# Patient Record
Sex: MALE | Race: ASIAN | Employment: FULL TIME | URBAN - METROPOLITAN AREA
[De-identification: names, ages, dates, MRNs, and addresses within clinical notes are randomized per-mention and may not be internally consistent; named-entity substitution may affect disease eponyms.]

---

## 2017-10-10 ENCOUNTER — OFFICE VISIT (OUTPATIENT)
Dept: FAMILY MEDICINE CLINIC | Facility: CLINIC | Age: 32
End: 2017-10-10

## 2017-10-10 ENCOUNTER — LAB ENCOUNTER (OUTPATIENT)
Dept: LAB | Age: 32
End: 2017-10-10
Attending: FAMILY MEDICINE
Payer: COMMERCIAL

## 2017-10-10 VITALS
RESPIRATION RATE: 18 BRPM | TEMPERATURE: 98 F | BODY MASS INDEX: 28.64 KG/M2 | HEIGHT: 73 IN | HEART RATE: 96 BPM | WEIGHT: 216.13 LBS | SYSTOLIC BLOOD PRESSURE: 118 MMHG | DIASTOLIC BLOOD PRESSURE: 86 MMHG

## 2017-10-10 DIAGNOSIS — Z00.00 ROUTINE GENERAL MEDICAL EXAMINATION AT A HEALTH CARE FACILITY: ICD-10-CM

## 2017-10-10 DIAGNOSIS — R20.2 PARESTHESIA OF BOTH FEET: ICD-10-CM

## 2017-10-10 DIAGNOSIS — Z00.00 ROUTINE GENERAL MEDICAL EXAMINATION AT A HEALTH CARE FACILITY: Primary | ICD-10-CM

## 2017-10-10 DIAGNOSIS — Z23 NEED FOR VACCINATION: ICD-10-CM

## 2017-10-10 PROCEDURE — 80061 LIPID PANEL: CPT | Performed by: FAMILY MEDICINE

## 2017-10-10 PROCEDURE — 82306 VITAMIN D 25 HYDROXY: CPT | Performed by: FAMILY MEDICINE

## 2017-10-10 PROCEDURE — 82607 VITAMIN B-12: CPT | Performed by: FAMILY MEDICINE

## 2017-10-10 PROCEDURE — 80050 GENERAL HEALTH PANEL: CPT | Performed by: FAMILY MEDICINE

## 2017-10-10 PROCEDURE — 90715 TDAP VACCINE 7 YRS/> IM: CPT | Performed by: FAMILY MEDICINE

## 2017-10-10 PROCEDURE — 90471 IMMUNIZATION ADMIN: CPT | Performed by: FAMILY MEDICINE

## 2017-10-10 PROCEDURE — 36415 COLL VENOUS BLD VENIPUNCTURE: CPT | Performed by: FAMILY MEDICINE

## 2017-10-10 PROCEDURE — 99385 PREV VISIT NEW AGE 18-39: CPT | Performed by: FAMILY MEDICINE

## 2017-10-10 NOTE — PROGRESS NOTES
Shyann Porras is a 34year old male here for Patient presents with:  Establish Care: Pt would like to get screen labs ordered if possible. HPI:   She is seen for initial visit.     Patient is seen for his routine physical, states had a physical last ye or chest pain on exertion. No edema or varicose veins. GI: No change in appetite, heartburn, diarrhea, constipation, or blood in stool. No hemorrhoids. : No pain, frequency, urgency or incontinence with urination.   Male: No erectile dysfunction or low supraclavicular, and axillary nodes normal   Neurologic:   CNII-XII intact. Normal strength, sensation and reflexes       Throughout, normal speech, alert and oriented     ASSESSMENT AND PLAN:     Rosas Trevino was seen today for establish care.     Diagnoses and al

## 2017-10-10 NOTE — PATIENT INSTRUCTIONS
Prevention Guidelines, Men Ages 25 to 44  Screening tests and vaccines are an important part of managing your health. Health counseling is essential, too. Below are guidelines for these, for men ages 25 to 44.  Talk with your healthcare provider to make s Hepatitis B Men at increased risk for infection – talk with your healthcare provider 3 doses over 6 months; second dose should be given 1 month after the first dose; the third dose should be given at least 2 months after the second dose and at least 4 elisa Date Last Reviewed: 3/30/2015  © 8497-0347 [address], [address] [place], [place] [address]. All rights reserved. This information is not intended as a substitute for professional medical care.  Always follow your healthcare professional

## 2017-10-11 ENCOUNTER — TELEPHONE (OUTPATIENT)
Dept: FAMILY MEDICINE CLINIC | Facility: CLINIC | Age: 32
End: 2017-10-11

## 2017-10-13 ENCOUNTER — PATIENT MESSAGE (OUTPATIENT)
Dept: FAMILY MEDICINE CLINIC | Facility: CLINIC | Age: 32
End: 2017-10-13

## 2017-10-13 NOTE — TELEPHONE ENCOUNTER
Patient is calling for lab results. He states no one returned his call from 10/11/17. He has checked MyChart and does not see results. Please return his call.

## 2017-10-16 NOTE — TELEPHONE ENCOUNTER
Berto did not get the information. Helaudelia! Neftaly Safer over the counter vitamin D 2000 IU daily and vitamin B12 2000 mg daily.    Limit carbohydrates and refined sugars in diet and increase foods rich in omega 3 fatty acids like fish and nuts in d

## 2017-10-16 NOTE — TELEPHONE ENCOUNTER
Patient Result Comments     Viewed by Yvon Brownlee on 10/13/2017 10:02 AM   Written by Ira Harvey MD on 10/13/2017  9:53 AM   Vitamin B12 and D deficiency, low HDL, glucose is mildly elevated, all other labs are normal.

## 2017-10-16 NOTE — TELEPHONE ENCOUNTER
Pt was able to read [employer] message. Lab results are a little off. However he does not understand what the next steps are. Transferred to Triage VM. Pls call.

## 2017-10-16 NOTE — TELEPHONE ENCOUNTER
This is the message I sent patient vis mychart. Hello! Kristin Sutton over the counter vitamin D 2000 IU daily and vitamin B12 2000 mg daily.    Limit carbohydrates and refined sugars in diet and increase foods rich in omega 3 fatty acids like fish and

## 2017-10-16 NOTE — TELEPHONE ENCOUNTER
Pt read [address]ed[address]. Pt understands the results are a little off. He would like to know next steps. Transferred to Triage vm. Please call.

## 2018-01-05 ENCOUNTER — TELEPHONE (OUTPATIENT)
Dept: FAMILY MEDICINE CLINIC | Facility: CLINIC | Age: 33
End: 2018-01-05

## 2018-01-05 DIAGNOSIS — E55.9 VITAMIN D DEFICIENCY: ICD-10-CM

## 2018-01-05 DIAGNOSIS — Z13.1 SCREENING FOR DIABETES MELLITUS: Primary | ICD-10-CM

## 2018-01-05 DIAGNOSIS — E53.8 VITAMIN B12 DEFICIENCY: ICD-10-CM

## 2018-01-05 NOTE — TELEPHONE ENCOUNTER
Neck and hand pain. Wants to speak with a Nurse before going to Urgent Care or [facility]. Requesting an appt next week for Dr Serjio Marquis. Declined suggestion/information today for UC.     Pt stated when he saw Dr eSrjio Marquis for a Physical, she said he needed to come back in 3

## 2018-01-05 NOTE — TELEPHONE ENCOUNTER
Patient needs to repeat his labs before any appointment. Says he will get them done this weekend. Neck and hand pain no injury. R  side of shoulder. Tried warm bath if helped somewhat. Pain Rated 5. Shoveled snow. Not getting worse.  Advised pain OTC

## 2018-03-14 ENCOUNTER — PATIENT MESSAGE (OUTPATIENT)
Dept: FAMILY MEDICINE CLINIC | Facility: CLINIC | Age: 33
End: 2018-03-14

## 2018-03-19 NOTE — TELEPHONE ENCOUNTER
From: Andres Starks  Sent: 3/19/2018 12:40 PM CDT  To: [facility] Clinical Staff  Subject: RE:Follow up from Immediate Care    Carmelo Alvarado,    Yes i am looking for appointment as soon as possible for Dr Cain Gabriel. Immediate care asked for me to go for MRI. So w

## 2018-03-20 ENCOUNTER — OFFICE VISIT (OUTPATIENT)
Dept: FAMILY MEDICINE CLINIC | Facility: CLINIC | Age: 33
End: 2018-03-20

## 2018-03-20 VITALS
HEART RATE: 106 BPM | TEMPERATURE: 98 F | BODY MASS INDEX: 27.99 KG/M2 | OXYGEN SATURATION: 98 % | SYSTOLIC BLOOD PRESSURE: 134 MMHG | HEIGHT: 73 IN | DIASTOLIC BLOOD PRESSURE: 92 MMHG | RESPIRATION RATE: 16 BRPM | WEIGHT: 211.19 LBS

## 2018-03-20 DIAGNOSIS — M62.838 MUSCLE SPASM: ICD-10-CM

## 2018-03-20 DIAGNOSIS — M54.12 CERVICAL RADICULAR PAIN: ICD-10-CM

## 2018-03-20 DIAGNOSIS — M54.2 CERVICALGIA: Primary | ICD-10-CM

## 2018-03-20 DIAGNOSIS — R03.0 ELEVATED BLOOD-PRESSURE READING, WITHOUT DIAGNOSIS OF HYPERTENSION: ICD-10-CM

## 2018-03-20 PROCEDURE — 99213 OFFICE O/P EST LOW 20 MIN: CPT | Performed by: FAMILY MEDICINE

## 2018-03-20 NOTE — PROGRESS NOTES
Tavares Herndon is a 34year old male. Patient presents with:  Hand Pain: Pain in right hand since last friday. HPI:   Patient complaining of neck pain, right upper arm pain intermittently ongoing for the past 2 months.   States last week had intense pa rotation and right flexion. Has pain radiating to his shoulder and upper arm with extension. LUNGS: clear to auscultation  CARDIO: RRR without murmur    ASSESSMENT AND PLAN:   Kirt Barksdale was seen today for hand pain.     Diagnoses and all orders for this visit:

## 2018-03-20 NOTE — PATIENT INSTRUCTIONS
Understanding Cervical Radiculopathy    Cervical radiculopathy is irritation or inflammation of a nerve root in the neck. It causes neck pain and other symptoms that may spread into the chest or down the arm.  To understand this condition, it helps to und In most cases, your healthcare provider will first try treatments that help relieve symptoms. These may include:  · Prescription or over-the-counter pain medicines. These help relieve pain and swelling. · Cold packs. These help reduce pain. · Resting.  Charlie Simpson Blood pressure monitors are designed for certain ages and cases. You can find monitors for older adults, for pregnant women, and for children. Make sure the one you choose is the right one for your age and situation.   The American Heart Association recomme · The cuff will tighten, then loosen. · The numbers will change. When they stop changing, your blood pressure reading will appear. · Take 2 or 3 readings one minute apart. Step 4.  Write down the results of each reading    · Write down your blood pressur

## 2018-07-30 ENCOUNTER — OFFICE VISIT (OUTPATIENT)
Dept: FAMILY MEDICINE CLINIC | Facility: CLINIC | Age: 33
End: 2018-07-30
Payer: COMMERCIAL

## 2018-07-30 VITALS
DIASTOLIC BLOOD PRESSURE: 80 MMHG | SYSTOLIC BLOOD PRESSURE: 122 MMHG | WEIGHT: 215 LBS | RESPIRATION RATE: 16 BRPM | BODY MASS INDEX: 28 KG/M2 | HEART RATE: 94 BPM | TEMPERATURE: 98 F | OXYGEN SATURATION: 99 %

## 2018-07-30 DIAGNOSIS — L60.9 NAIL ABNORMALITY: ICD-10-CM

## 2018-07-30 DIAGNOSIS — M54.12 CERVICAL RADICULAR PAIN: ICD-10-CM

## 2018-07-30 DIAGNOSIS — K21.9 GASTROESOPHAGEAL REFLUX DISEASE WITHOUT ESOPHAGITIS: Primary | ICD-10-CM

## 2018-07-30 PROCEDURE — 99213 OFFICE O/P EST LOW 20 MIN: CPT | Performed by: FAMILY MEDICINE

## 2018-07-30 NOTE — PATIENT INSTRUCTIONS
Please start and take pantoprazole for 3 months. Tips to Control Acid Reflux    To control acid reflux, you’ll need to make some basic diet and lifestyle changes. The simple steps outlined below may be all you’ll need to ease discomfort.   Watch what you e

## 2018-07-30 NOTE — PROGRESS NOTES
Hossein Phillips is a 34year old male. Patient presents with: Follow - Up: Acid reflux symptoms went to urgent care had chest xray and it was normal here for f/u. HPI:   Patient is seen for follow-up from ER.   States has been experiencing acid reflux supple,no tenderness to palpation over the cervical spine  LUNGS: clear to auscultation  CARDIO: RRR without murmur  ABDOMEN: soft, non tender  EXTREMITIES: no cyanosis, clubbing or edema  TOE NAIL: bilateral hypertrophied and discolored.     ASSESSMENT AND

## 2019-02-25 ENCOUNTER — OFFICE VISIT (OUTPATIENT)
Dept: FAMILY MEDICINE CLINIC | Facility: CLINIC | Age: 34
End: 2019-02-25
Payer: COMMERCIAL

## 2019-02-25 VITALS
SYSTOLIC BLOOD PRESSURE: 132 MMHG | HEART RATE: 95 BPM | BODY MASS INDEX: 27.73 KG/M2 | HEIGHT: 73 IN | TEMPERATURE: 98 F | OXYGEN SATURATION: 97 % | DIASTOLIC BLOOD PRESSURE: 72 MMHG | RESPIRATION RATE: 18 BRPM | WEIGHT: 209.25 LBS

## 2019-02-25 DIAGNOSIS — R30.0 DYSURIA: Primary | ICD-10-CM

## 2019-02-25 DIAGNOSIS — J06.9 VIRAL URI: ICD-10-CM

## 2019-02-25 LAB
APPEARANCE: CLEAR
BILIRUBIN: NEGATIVE
GLUCOSE (URINE DIPSTICK): NEGATIVE MG/DL
KETONES (URINE DIPSTICK): NEGATIVE MG/DL
MULTISTIX LOT#: NORMAL NUMERIC
NITRITE, URINE: NEGATIVE
OCCULT BLOOD: NEGATIVE
PH, URINE: 5 (ref 4.5–8)
PROTEIN (URINE DIPSTICK): NEGATIVE MG/DL
SPECIFIC GRAVITY: 1.01 (ref 1–1.03)
URINE-COLOR: YELLOW
UROBILINOGEN,SEMI-QN: 0.2 MG/DL (ref 0–1.9)

## 2019-02-25 PROCEDURE — 81003 URINALYSIS AUTO W/O SCOPE: CPT | Performed by: FAMILY MEDICINE

## 2019-02-25 PROCEDURE — 99213 OFFICE O/P EST LOW 20 MIN: CPT | Performed by: FAMILY MEDICINE

## 2019-02-25 PROCEDURE — 87086 URINE CULTURE/COLONY COUNT: CPT | Performed by: FAMILY MEDICINE

## 2019-02-25 PROCEDURE — 87186 SC STD MICRODIL/AGAR DIL: CPT | Performed by: FAMILY MEDICINE

## 2019-02-25 PROCEDURE — 87077 CULTURE AEROBIC IDENTIFY: CPT | Performed by: FAMILY MEDICINE

## 2019-02-25 NOTE — PROGRESS NOTES
Aneta García is a 28year old male. Patient presents with:  Cold: Started on Friday with sinus congestion and heache along with bodyaches.     HPI:   Patient complaining of body ache and chills that started on Friday, states on Thursday felt some pain i

## 2019-02-25 NOTE — PATIENT INSTRUCTIONS
Will call with culture results when available, please continue to push fluids. Adult Self-Care for Colds    Colds are caused by viruses. They can't be cured with antibiotics.  However, you can ease symptoms and support your body's efforts to heal itsel first notice symptoms, ask your healthcare provider if antiviral medicines are appropriate. Antibiotics should not be taken for colds or flu.  Also, call your healthcare provider if you have any of the following symptoms or if you aren't feeling better afte

## 2019-04-08 ENCOUNTER — OFFICE VISIT (OUTPATIENT)
Dept: FAMILY MEDICINE CLINIC | Facility: CLINIC | Age: 34
End: 2019-04-08
Payer: COMMERCIAL

## 2019-04-08 ENCOUNTER — LAB ENCOUNTER (OUTPATIENT)
Dept: LAB | Age: 34
End: 2019-04-08
Attending: FAMILY MEDICINE
Payer: COMMERCIAL

## 2019-04-08 VITALS
RESPIRATION RATE: 18 BRPM | WEIGHT: 205 LBS | TEMPERATURE: 98 F | DIASTOLIC BLOOD PRESSURE: 82 MMHG | HEART RATE: 76 BPM | SYSTOLIC BLOOD PRESSURE: 124 MMHG | OXYGEN SATURATION: 99 % | HEIGHT: 73 IN | BODY MASS INDEX: 27.17 KG/M2

## 2019-04-08 DIAGNOSIS — R30.0 DYSURIA: ICD-10-CM

## 2019-04-08 DIAGNOSIS — Z00.00 ROUTINE GENERAL MEDICAL EXAMINATION AT A HEALTH CARE FACILITY: ICD-10-CM

## 2019-04-08 DIAGNOSIS — E55.9 VITAMIN D DEFICIENCY: ICD-10-CM

## 2019-04-08 DIAGNOSIS — E53.8 VITAMIN B12 DEFICIENCY: ICD-10-CM

## 2019-04-08 DIAGNOSIS — Z00.00 ROUTINE GENERAL MEDICAL EXAMINATION AT A HEALTH CARE FACILITY: Primary | ICD-10-CM

## 2019-04-08 PROCEDURE — 81003 URINALYSIS AUTO W/O SCOPE: CPT | Performed by: FAMILY MEDICINE

## 2019-04-08 PROCEDURE — 82306 VITAMIN D 25 HYDROXY: CPT | Performed by: FAMILY MEDICINE

## 2019-04-08 PROCEDURE — 80053 COMPREHEN METABOLIC PANEL: CPT | Performed by: FAMILY MEDICINE

## 2019-04-08 PROCEDURE — 99395 PREV VISIT EST AGE 18-39: CPT | Performed by: FAMILY MEDICINE

## 2019-04-08 PROCEDURE — 82607 VITAMIN B-12: CPT | Performed by: FAMILY MEDICINE

## 2019-04-08 PROCEDURE — 80061 LIPID PANEL: CPT | Performed by: FAMILY MEDICINE

## 2019-04-08 NOTE — PROGRESS NOTES
Ahsan Bell is a 28year old male here for Patient presents with:   Well Adult    HPI:   Patient is seen for routine annual physical.    Patient states only took vitamin B12 for low while and has not continue taking it, continues to feel some tingling a polyphagia). Respiratory: No cough, shortness of breath, dyspnea on exertion, wheezing, hemoptysis. Cardiovascular: No heart palpitations, irregular heartbeat, faintness or syncope, no chest pressure or chest pain on exertion. No edema or varicose veins. masses, no hernias, circumcised   Extremities:   Extremities normal, atraumatic, no cyanosis or edema   Pulses:   2+ and symmetric all extremities   Skin:    no rashes or suspicious lesions   Lymph nodes:   Cervical, supraclavicular, and axillary nodes nor

## 2019-04-15 PROCEDURE — 87086 URINE CULTURE/COLONY COUNT: CPT | Performed by: PHYSICIAN ASSISTANT

## 2020-08-12 ENCOUNTER — TELEMEDICINE (OUTPATIENT)
Dept: FAMILY MEDICINE CLINIC | Facility: CLINIC | Age: 35
End: 2020-08-12

## 2020-08-12 VITALS — WEIGHT: 200 LBS | BODY MASS INDEX: 26 KG/M2

## 2020-08-12 DIAGNOSIS — M79.651 PAIN IN BOTH THIGHS: ICD-10-CM

## 2020-08-12 DIAGNOSIS — M79.652 PAIN IN BOTH THIGHS: ICD-10-CM

## 2020-08-12 DIAGNOSIS — R07.89 CHEST PAIN, MUSCULOSKELETAL: Primary | ICD-10-CM

## 2020-08-12 PROCEDURE — 99213 OFFICE O/P EST LOW 20 MIN: CPT | Performed by: FAMILY MEDICINE

## 2020-08-12 NOTE — PROGRESS NOTES
Margarita Lancaster is a 27year old male. Patient presents with:  Gastro-esophageal Reflux    This visit is conducted using telemedicine with live interactive video and audio. Monica Membreno verbally consents to virtual video visit.    Patient understands and severe, if pain worsens and he is experiencing other symptoms to follow up. Please note that the following visit was completed using two-way, real-time interactive audio and/or video communication.   This has been done in good von to provide continuity

## 2021-05-04 ENCOUNTER — OFFICE VISIT (OUTPATIENT)
Dept: FAMILY MEDICINE CLINIC | Facility: CLINIC | Age: 36
End: 2021-05-04
Payer: COMMERCIAL

## 2021-05-04 VITALS
OXYGEN SATURATION: 98 % | RESPIRATION RATE: 18 BRPM | HEIGHT: 73 IN | DIASTOLIC BLOOD PRESSURE: 80 MMHG | TEMPERATURE: 97 F | BODY MASS INDEX: 27.54 KG/M2 | SYSTOLIC BLOOD PRESSURE: 120 MMHG | HEART RATE: 82 BPM | WEIGHT: 207.81 LBS

## 2021-05-04 DIAGNOSIS — R20.2 PARESTHESIA OF BOTH FEET: Primary | ICD-10-CM

## 2021-05-04 PROCEDURE — 99212 OFFICE O/P EST SF 10 MIN: CPT | Performed by: FAMILY MEDICINE

## 2021-05-04 PROCEDURE — 3074F SYST BP LT 130 MM HG: CPT | Performed by: FAMILY MEDICINE

## 2021-05-04 PROCEDURE — 3008F BODY MASS INDEX DOCD: CPT | Performed by: FAMILY MEDICINE

## 2021-05-04 PROCEDURE — 3079F DIAST BP 80-89 MM HG: CPT | Performed by: FAMILY MEDICINE

## 2021-05-04 NOTE — PROGRESS NOTES
Yvon Brownlee is a 38year old male.   Patient presents with:  Numbness    HPI:   Yvon Brownlee is a 38year old male complaining of tingling and numbness in the left foot on and off for a while, worse for the past 1 month, feeling it at night and state

## 2021-05-17 ENCOUNTER — OFFICE VISIT (OUTPATIENT)
Dept: FAMILY MEDICINE CLINIC | Facility: CLINIC | Age: 36
End: 2021-05-17
Payer: COMMERCIAL

## 2021-05-17 ENCOUNTER — LABORATORY ENCOUNTER (OUTPATIENT)
Dept: LAB | Age: 36
End: 2021-05-17
Attending: FAMILY MEDICINE
Payer: COMMERCIAL

## 2021-05-17 VITALS
HEIGHT: 73 IN | HEART RATE: 82 BPM | OXYGEN SATURATION: 98 % | RESPIRATION RATE: 18 BRPM | SYSTOLIC BLOOD PRESSURE: 120 MMHG | TEMPERATURE: 98 F | BODY MASS INDEX: 27.7 KG/M2 | DIASTOLIC BLOOD PRESSURE: 80 MMHG | WEIGHT: 209 LBS

## 2021-05-17 DIAGNOSIS — E55.9 VITAMIN D DEFICIENCY: ICD-10-CM

## 2021-05-17 DIAGNOSIS — Z00.00 ROUTINE GENERAL MEDICAL EXAMINATION AT A HEALTH CARE FACILITY: Primary | ICD-10-CM

## 2021-05-17 DIAGNOSIS — Z00.00 ROUTINE GENERAL MEDICAL EXAMINATION AT A HEALTH CARE FACILITY: ICD-10-CM

## 2021-05-17 DIAGNOSIS — R20.2 PARESTHESIA OF BOTH FEET: ICD-10-CM

## 2021-05-17 PROCEDURE — 80061 LIPID PANEL: CPT | Performed by: FAMILY MEDICINE

## 2021-05-17 PROCEDURE — 3074F SYST BP LT 130 MM HG: CPT | Performed by: FAMILY MEDICINE

## 2021-05-17 PROCEDURE — 3079F DIAST BP 80-89 MM HG: CPT | Performed by: FAMILY MEDICINE

## 2021-05-17 PROCEDURE — 82607 VITAMIN B-12: CPT | Performed by: FAMILY MEDICINE

## 2021-05-17 PROCEDURE — 80050 GENERAL HEALTH PANEL: CPT | Performed by: FAMILY MEDICINE

## 2021-05-17 PROCEDURE — 3008F BODY MASS INDEX DOCD: CPT | Performed by: FAMILY MEDICINE

## 2021-05-17 PROCEDURE — 82306 VITAMIN D 25 HYDROXY: CPT | Performed by: FAMILY MEDICINE

## 2021-05-17 PROCEDURE — 99395 PREV VISIT EST AGE 18-39: CPT | Performed by: FAMILY MEDICINE

## 2021-05-17 NOTE — PROGRESS NOTES
Hossein Phillips is a 38year old male.   Patient presents with:  Physical    HPI:   Hossein Phillips is a 38year old male with no significant past medical history seen for his annual physical.    Patient states tries to eat healthy, tries to walk around on Negative for food allergies. Neurological: Negative for dizziness, weakness, numbness and headaches. Hematological: Negative for adenopathy. Does not bruise/bleed easily. Psychiatric/Behavioral: Negative for dysphoric mood and sleep disturbance.  The and symmetric. Psychiatric:         Mood and Affect: Mood normal.         Behavior: Behavior normal.         Thought Content:  Thought content normal.         Judgment: Judgment normal.        ASSESSMENT AND PLAN:   Cindy Miguel was seen today for physical.    Leela Hamilton

## 2021-05-17 NOTE — PATIENT INSTRUCTIONS
Prevention Guidelines, Men Ages 25 to 44  Screening tests and vaccines are an important part of managing your health. A screening test is done to find possible disorders or diseases in people who don't have any symptoms.  The goal is to find a disease ear vaccine 2 doses; the second dose should be given at least 4 weeks after the first dose   Hepatitis A Men at increased risk for infection – talk with your healthcare provider 2 doses given at least 6 months apart   Hepatitis B Men at increased risk for infe be reminded to avoid intentional tanning and tanning beds. 1Those who are 25years of age, who are not up-to-date on their childhood immunizations, should get all appropriate catch-up vaccines recommended by the CDC.    Noris last reviewed this educat

## 2021-10-13 ENCOUNTER — TELEPHONE (OUTPATIENT)
Dept: FAMILY MEDICINE CLINIC | Facility: CLINIC | Age: 36
End: 2021-10-13

## 2021-10-13 NOTE — TELEPHONE ENCOUNTER
Please triage   ----- Message -----   From: Kenyatta Arroyo   Sent: 10/12/2021   5:36 PM CDT   To: [facility] Front Office   Subject: Appointment Request                                Appointment Request From: Kenyatta Arroyo      With Provider: Kranthi Escalante

## 2021-10-18 ENCOUNTER — OFFICE VISIT (OUTPATIENT)
Dept: FAMILY MEDICINE CLINIC | Facility: CLINIC | Age: 36
End: 2021-10-18
Payer: COMMERCIAL

## 2021-10-18 ENCOUNTER — LAB ENCOUNTER (OUTPATIENT)
Dept: LAB | Facility: HOSPITAL | Age: 36
End: 2021-10-18
Attending: FAMILY MEDICINE
Payer: COMMERCIAL

## 2021-10-18 VITALS
OXYGEN SATURATION: 98 % | RESPIRATION RATE: 18 BRPM | DIASTOLIC BLOOD PRESSURE: 80 MMHG | HEIGHT: 73 IN | WEIGHT: 212 LBS | HEART RATE: 100 BPM | BODY MASS INDEX: 28.1 KG/M2 | TEMPERATURE: 97 F | SYSTOLIC BLOOD PRESSURE: 130 MMHG

## 2021-10-18 DIAGNOSIS — Z23 NEED FOR VACCINATION: ICD-10-CM

## 2021-10-18 DIAGNOSIS — K21.9 GASTROESOPHAGEAL REFLUX DISEASE, UNSPECIFIED WHETHER ESOPHAGITIS PRESENT: ICD-10-CM

## 2021-10-18 DIAGNOSIS — E55.9 VITAMIN D DEFICIENCY: ICD-10-CM

## 2021-10-18 DIAGNOSIS — E53.8 VITAMIN B12 DEFICIENCY: ICD-10-CM

## 2021-10-18 DIAGNOSIS — R10.13 EPIGASTRIC PAIN: ICD-10-CM

## 2021-10-18 DIAGNOSIS — R10.13 EPIGASTRIC PAIN: Primary | ICD-10-CM

## 2021-10-18 DIAGNOSIS — R07.9 CHEST PAIN, UNSPECIFIED TYPE: ICD-10-CM

## 2021-10-18 PROCEDURE — 3075F SYST BP GE 130 - 139MM HG: CPT | Performed by: FAMILY MEDICINE

## 2021-10-18 PROCEDURE — 3079F DIAST BP 80-89 MM HG: CPT | Performed by: FAMILY MEDICINE

## 2021-10-18 PROCEDURE — 93000 ELECTROCARDIOGRAM COMPLETE: CPT | Performed by: FAMILY MEDICINE

## 2021-10-18 PROCEDURE — 3008F BODY MASS INDEX DOCD: CPT | Performed by: FAMILY MEDICINE

## 2021-10-18 PROCEDURE — 90471 IMMUNIZATION ADMIN: CPT | Performed by: FAMILY MEDICINE

## 2021-10-18 PROCEDURE — 99213 OFFICE O/P EST LOW 20 MIN: CPT | Performed by: FAMILY MEDICINE

## 2021-10-18 PROCEDURE — 96372 THER/PROPH/DIAG INJ SC/IM: CPT | Performed by: FAMILY MEDICINE

## 2021-10-18 PROCEDURE — 87338 HPYLORI STOOL AG IA: CPT

## 2021-10-18 PROCEDURE — 90686 IIV4 VACC NO PRSV 0.5 ML IM: CPT | Performed by: FAMILY MEDICINE

## 2021-10-18 RX ORDER — ERGOCALCIFEROL 1.25 MG/1
50000 CAPSULE ORAL WEEKLY
Qty: 12 CAPSULE | Refills: 0 | Status: SHIPPED | OUTPATIENT
Start: 2021-10-18 | End: 2022-01-16

## 2021-10-18 RX ORDER — CYANOCOBALAMIN 1000 UG/ML
1000 INJECTION INTRAMUSCULAR; SUBCUTANEOUS ONCE
Status: COMPLETED | OUTPATIENT
Start: 2021-10-18 | End: 2021-10-18

## 2021-10-18 RX ADMIN — CYANOCOBALAMIN 1000 MCG: 1000 INJECTION INTRAMUSCULAR; SUBCUTANEOUS at 12:30:00

## 2021-10-18 NOTE — PATIENT INSTRUCTIONS
Lifestyle Changes for Controlling GERD  When you have GERD, stomach acid feels as if it’s backing up toward your mouth. Making lifestyle changes can often improve your symptoms. This is true if you take medicine to control your GERD or not.  Talk with you instructions.

## 2021-10-18 NOTE — PROGRESS NOTES
Vega Stephenson is a 39year old male.   Patient presents with:  Gastro-esophageal Reflux    HPI:   Vega Stephenson is a 39year old male complaining of abdominal pain epigastric for the past month, states had acid reflux almost daily, took otc prilosec for Future    Gastroesophageal reflux disease, unspecified whether esophagitis present  -     H. PYLORI STOOL AG, EIA; Future  Advised if H. Pylori tet is negative will start him on a PPI. Diet changes to prevent reflux discussed with patient.     Chest pain, un

## 2021-10-25 ENCOUNTER — NURSE ONLY (OUTPATIENT)
Dept: FAMILY MEDICINE CLINIC | Facility: CLINIC | Age: 36
End: 2021-10-25
Payer: COMMERCIAL

## 2021-10-25 DIAGNOSIS — E53.8 B12 DEFICIENCY: Primary | ICD-10-CM

## 2021-10-25 PROCEDURE — 96372 THER/PROPH/DIAG INJ SC/IM: CPT | Performed by: FAMILY MEDICINE

## 2021-10-25 RX ORDER — CYANOCOBALAMIN 1000 UG/ML
1000 INJECTION INTRAMUSCULAR; SUBCUTANEOUS ONCE
Status: COMPLETED | OUTPATIENT
Start: 2021-10-25 | End: 2021-10-25

## 2021-10-25 RX ADMIN — CYANOCOBALAMIN 1000 MCG: 1000 INJECTION INTRAMUSCULAR; SUBCUTANEOUS at 11:47:00

## 2021-11-01 ENCOUNTER — NURSE ONLY (OUTPATIENT)
Dept: FAMILY MEDICINE CLINIC | Facility: CLINIC | Age: 36
End: 2021-11-01
Payer: COMMERCIAL

## 2021-11-01 DIAGNOSIS — E53.8 B12 DEFICIENCY: Primary | ICD-10-CM

## 2021-11-01 PROCEDURE — 96372 THER/PROPH/DIAG INJ SC/IM: CPT | Performed by: FAMILY MEDICINE

## 2021-11-01 RX ORDER — CYANOCOBALAMIN 1000 UG/ML
1000 INJECTION INTRAMUSCULAR; SUBCUTANEOUS ONCE
Status: COMPLETED | OUTPATIENT
Start: 2021-11-01 | End: 2021-11-01

## 2021-11-01 RX ADMIN — CYANOCOBALAMIN 1000 MCG: 1000 INJECTION INTRAMUSCULAR; SUBCUTANEOUS at 12:09:00

## 2021-11-08 ENCOUNTER — NURSE ONLY (OUTPATIENT)
Dept: FAMILY MEDICINE CLINIC | Facility: CLINIC | Age: 36
End: 2021-11-08
Payer: COMMERCIAL

## 2021-11-08 DIAGNOSIS — E53.8 B12 DEFICIENCY: Primary | ICD-10-CM

## 2021-11-08 PROCEDURE — 96372 THER/PROPH/DIAG INJ SC/IM: CPT | Performed by: FAMILY MEDICINE

## 2021-11-08 RX ORDER — CYANOCOBALAMIN 1000 UG/ML
1000 INJECTION INTRAMUSCULAR; SUBCUTANEOUS ONCE
Status: COMPLETED | OUTPATIENT
Start: 2021-11-08 | End: 2021-11-08

## 2021-11-08 RX ADMIN — CYANOCOBALAMIN 1000 MCG: 1000 INJECTION INTRAMUSCULAR; SUBCUTANEOUS at 12:45:00

## 2021-12-06 ENCOUNTER — NURSE ONLY (OUTPATIENT)
Dept: FAMILY MEDICINE CLINIC | Facility: CLINIC | Age: 36
End: 2021-12-06
Payer: COMMERCIAL

## 2021-12-06 DIAGNOSIS — E53.8 B12 DEFICIENCY: Primary | ICD-10-CM

## 2021-12-06 PROCEDURE — 96372 THER/PROPH/DIAG INJ SC/IM: CPT | Performed by: FAMILY MEDICINE

## 2021-12-06 RX ORDER — CYANOCOBALAMIN 1000 UG/ML
1000 INJECTION INTRAMUSCULAR; SUBCUTANEOUS ONCE
Status: COMPLETED | OUTPATIENT
Start: 2021-12-06 | End: 2021-12-06

## 2021-12-06 RX ADMIN — CYANOCOBALAMIN 1000 MCG: 1000 INJECTION INTRAMUSCULAR; SUBCUTANEOUS at 12:40:00

## 2022-01-10 ENCOUNTER — NURSE ONLY (OUTPATIENT)
Dept: FAMILY MEDICINE CLINIC | Facility: CLINIC | Age: 37
End: 2022-01-10
Payer: COMMERCIAL

## 2022-01-10 DIAGNOSIS — E53.8 B12 DEFICIENCY: Primary | ICD-10-CM

## 2022-01-10 PROCEDURE — 96372 THER/PROPH/DIAG INJ SC/IM: CPT | Performed by: FAMILY MEDICINE

## 2022-01-10 RX ORDER — CYANOCOBALAMIN 1000 UG/ML
1000 INJECTION INTRAMUSCULAR; SUBCUTANEOUS ONCE
Status: COMPLETED | OUTPATIENT
Start: 2022-01-10 | End: 2022-01-10

## 2022-01-10 RX ADMIN — CYANOCOBALAMIN 1000 MCG: 1000 INJECTION INTRAMUSCULAR; SUBCUTANEOUS at 12:18:00

## 2022-01-11 DIAGNOSIS — E55.9 VITAMIN D DEFICIENCY: ICD-10-CM

## 2022-01-11 RX ORDER — ERGOCALCIFEROL 1.25 MG/1
CAPSULE ORAL
Qty: 12 CAPSULE | Refills: 0 | OUTPATIENT
Start: 2022-01-11

## 2022-01-17 ENCOUNTER — OFFICE VISIT (OUTPATIENT)
Dept: FAMILY MEDICINE CLINIC | Facility: CLINIC | Age: 37
End: 2022-01-17
Payer: COMMERCIAL

## 2022-01-17 VITALS
BODY MASS INDEX: 27.83 KG/M2 | WEIGHT: 210 LBS | RESPIRATION RATE: 18 BRPM | HEART RATE: 105 BPM | DIASTOLIC BLOOD PRESSURE: 80 MMHG | TEMPERATURE: 98 F | OXYGEN SATURATION: 98 % | SYSTOLIC BLOOD PRESSURE: 130 MMHG | HEIGHT: 73 IN

## 2022-01-17 DIAGNOSIS — R00.0 TACHYCARDIA: ICD-10-CM

## 2022-01-17 DIAGNOSIS — R73.01 IMPAIRED FASTING GLUCOSE: ICD-10-CM

## 2022-01-17 DIAGNOSIS — R10.13 EPIGASTRIC ABDOMINAL PAIN: ICD-10-CM

## 2022-01-17 DIAGNOSIS — K21.9 GASTROESOPHAGEAL REFLUX DISEASE, UNSPECIFIED WHETHER ESOPHAGITIS PRESENT: ICD-10-CM

## 2022-01-17 DIAGNOSIS — R07.9 CHEST PAIN, UNSPECIFIED TYPE: Primary | ICD-10-CM

## 2022-01-17 DIAGNOSIS — R79.89 ELEVATED LFTS: ICD-10-CM

## 2022-01-17 PROCEDURE — 3075F SYST BP GE 130 - 139MM HG: CPT | Performed by: FAMILY MEDICINE

## 2022-01-17 PROCEDURE — 3079F DIAST BP 80-89 MM HG: CPT | Performed by: FAMILY MEDICINE

## 2022-01-17 PROCEDURE — 99214 OFFICE O/P EST MOD 30 MIN: CPT | Performed by: FAMILY MEDICINE

## 2022-01-17 PROCEDURE — 3008F BODY MASS INDEX DOCD: CPT | Performed by: FAMILY MEDICINE

## 2022-01-17 NOTE — PATIENT INSTRUCTIONS
Tips to Control Acid Reflux    To control acid reflux, you’ll need to make some basic diet and lifestyle changes. The simple steps outlined below may be all you’ll need to ease discomfort. Watch what you eat  · Don't have fatty foods or spicy foods.

## 2022-01-25 NOTE — PROGRESS NOTES
Jazmin Curtis is a 39year old male.   Patient presents with:  Gastro-esophageal Reflux    HPI:   Jazmin Curtis is a 39year old male complaining that his heart rate has been elevated since he got his Covid booster, can go up to 120s resting heart rate 27.0 - 33.2 pg 29.6   MCHC      31.0 - 37.0 g/dL 33.9   Platelet Count      930 - 450 10:3/uL 237   RDW      11.5 - 16.0 % 12.4   MEAN PLATELET VOLUME      7.0 - 11.5 fL 10.3   Neutrophils Absolute      1.30 - 6.70 10ˆ3/µL 5.04   Lymphocytes Absolute taking history, performing physical exam, reviewing external test results, counseling and educating patient, answering patient questions and documenting in EHR.

## 2022-02-07 ENCOUNTER — NURSE ONLY (OUTPATIENT)
Dept: FAMILY MEDICINE CLINIC | Facility: CLINIC | Age: 37
End: 2022-02-07
Payer: COMMERCIAL

## 2022-02-07 DIAGNOSIS — E53.8 B12 DEFICIENCY: Primary | ICD-10-CM

## 2022-02-07 PROCEDURE — 96372 THER/PROPH/DIAG INJ SC/IM: CPT | Performed by: FAMILY MEDICINE

## 2022-02-07 RX ORDER — CYANOCOBALAMIN 1000 UG/ML
1000 INJECTION INTRAMUSCULAR; SUBCUTANEOUS ONCE
Status: COMPLETED | OUTPATIENT
Start: 2022-02-07 | End: 2022-02-07

## 2022-02-07 RX ADMIN — CYANOCOBALAMIN 1000 MCG: 1000 INJECTION INTRAMUSCULAR; SUBCUTANEOUS at 12:00:00

## 2022-02-25 ENCOUNTER — LAB ENCOUNTER (OUTPATIENT)
Dept: LAB | Age: 37
End: 2022-02-25
Attending: FAMILY MEDICINE
Payer: COMMERCIAL

## 2022-02-25 ENCOUNTER — LAB ENCOUNTER (OUTPATIENT)
Dept: LAB | Age: 37
End: 2022-02-25
Attending: INTERNAL MEDICINE
Payer: COMMERCIAL

## 2022-02-25 DIAGNOSIS — R73.01 IMPAIRED FASTING GLUCOSE: ICD-10-CM

## 2022-02-25 DIAGNOSIS — R79.89 ELEVATED LFTS: ICD-10-CM

## 2022-02-25 LAB
ALBUMIN SERPL-MCNC: 4.4 G/DL (ref 3.4–5)
ALBUMIN/GLOB SERPL: 1.2 {RATIO} (ref 1–2)
ALP LIVER SERPL-CCNC: 104 U/L
ALT SERPL-CCNC: 41 U/L
ANION GAP SERPL CALC-SCNC: 5 MMOL/L (ref 0–18)
AST SERPL-CCNC: 25 U/L (ref 15–37)
BILIRUB DIRECT SERPL-MCNC: 0.2 MG/DL (ref 0–0.2)
BILIRUB SERPL-MCNC: 0.7 MG/DL (ref 0.1–2)
BUN BLD-MCNC: 12 MG/DL (ref 7–18)
CALCIUM BLD-MCNC: 9.5 MG/DL (ref 8.5–10.1)
CHLORIDE SERPL-SCNC: 103 MMOL/L (ref 98–112)
CO2 SERPL-SCNC: 30 MMOL/L (ref 21–32)
CREAT BLD-MCNC: 0.99 MG/DL
EST. AVERAGE GLUCOSE BLD GHB EST-MCNC: 114 MG/DL (ref 68–126)
FASTING STATUS PATIENT QL REPORTED: YES
GLOBULIN PLAS-MCNC: 3.8 G/DL (ref 2.8–4.4)
GLUCOSE BLD-MCNC: 104 MG/DL (ref 70–99)
HBA1C MFR BLD: 5.6 % (ref ?–5.7)
OSMOLALITY SERPL CALC.SUM OF ELEC: 286 MOSM/KG (ref 275–295)
POTASSIUM SERPL-SCNC: 4.6 MMOL/L (ref 3.5–5.1)
PROT SERPL-MCNC: 8.2 G/DL (ref 6.4–8.2)
SODIUM SERPL-SCNC: 138 MMOL/L (ref 136–145)

## 2022-02-25 PROCEDURE — 83036 HEMOGLOBIN GLYCOSYLATED A1C: CPT | Performed by: FAMILY MEDICINE

## 2022-02-25 PROCEDURE — 80053 COMPREHEN METABOLIC PANEL: CPT | Performed by: FAMILY MEDICINE

## 2023-02-15 ENCOUNTER — OFFICE VISIT (OUTPATIENT)
Dept: FAMILY MEDICINE CLINIC | Facility: CLINIC | Age: 38
End: 2023-02-15
Payer: COMMERCIAL

## 2023-02-15 VITALS
WEIGHT: 210 LBS | HEART RATE: 102 BPM | BODY MASS INDEX: 26.95 KG/M2 | SYSTOLIC BLOOD PRESSURE: 130 MMHG | TEMPERATURE: 98 F | DIASTOLIC BLOOD PRESSURE: 80 MMHG | RESPIRATION RATE: 16 BRPM | HEIGHT: 74 IN | OXYGEN SATURATION: 99 %

## 2023-02-15 DIAGNOSIS — K21.9 GASTROESOPHAGEAL REFLUX DISEASE, UNSPECIFIED WHETHER ESOPHAGITIS PRESENT: ICD-10-CM

## 2023-02-15 DIAGNOSIS — R20.2 PARESTHESIA: ICD-10-CM

## 2023-02-15 DIAGNOSIS — Z00.00 LABORATORY EXAMINATION ORDERED AS PART OF A ROUTINE GENERAL MEDICAL EXAMINATION: ICD-10-CM

## 2023-02-15 DIAGNOSIS — E53.8 B12 DEFICIENCY: ICD-10-CM

## 2023-02-15 DIAGNOSIS — E55.9 VITAMIN D DEFICIENCY: ICD-10-CM

## 2023-02-15 DIAGNOSIS — R05.2 SUBACUTE COUGH: Primary | ICD-10-CM

## 2023-02-15 DIAGNOSIS — H93.13 TINNITUS OF BOTH EARS: ICD-10-CM

## 2023-02-15 DIAGNOSIS — R53.83 FATIGUE, UNSPECIFIED TYPE: ICD-10-CM

## 2023-02-15 PROCEDURE — 3075F SYST BP GE 130 - 139MM HG: CPT | Performed by: FAMILY MEDICINE

## 2023-02-15 PROCEDURE — 99214 OFFICE O/P EST MOD 30 MIN: CPT | Performed by: FAMILY MEDICINE

## 2023-02-15 PROCEDURE — 3008F BODY MASS INDEX DOCD: CPT | Performed by: FAMILY MEDICINE

## 2023-02-15 PROCEDURE — 3079F DIAST BP 80-89 MM HG: CPT | Performed by: FAMILY MEDICINE

## 2023-02-15 RX ORDER — FAMOTIDINE 20 MG/1
20 TABLET, FILM COATED ORAL 2 TIMES DAILY
Qty: 60 TABLET | Refills: 0 | Status: SHIPPED | OUTPATIENT
Start: 2023-02-15 | End: 2023-03-17

## 2023-06-12 ENCOUNTER — OFFICE VISIT (OUTPATIENT)
Dept: FAMILY MEDICINE CLINIC | Facility: CLINIC | Age: 38
End: 2023-06-12
Payer: COMMERCIAL

## 2023-06-12 VITALS
RESPIRATION RATE: 18 BRPM | WEIGHT: 218 LBS | DIASTOLIC BLOOD PRESSURE: 90 MMHG | BODY MASS INDEX: 27.98 KG/M2 | SYSTOLIC BLOOD PRESSURE: 144 MMHG | HEART RATE: 105 BPM | TEMPERATURE: 98 F | HEIGHT: 74 IN | OXYGEN SATURATION: 98 %

## 2023-06-12 DIAGNOSIS — Z00.00 ROUTINE GENERAL MEDICAL EXAMINATION AT A HEALTH CARE FACILITY: Primary | ICD-10-CM

## 2023-06-12 DIAGNOSIS — R03.0 ELEVATED BLOOD PRESSURE READING IN OFFICE WITHOUT DIAGNOSIS OF HYPERTENSION: ICD-10-CM

## 2023-06-12 DIAGNOSIS — Z13.1 SCREENING FOR DIABETES MELLITUS: ICD-10-CM

## 2023-06-12 PROCEDURE — 3080F DIAST BP >= 90 MM HG: CPT | Performed by: FAMILY MEDICINE

## 2023-06-12 PROCEDURE — 99395 PREV VISIT EST AGE 18-39: CPT | Performed by: FAMILY MEDICINE

## 2023-06-12 PROCEDURE — 3077F SYST BP >= 140 MM HG: CPT | Performed by: FAMILY MEDICINE

## 2023-06-12 PROCEDURE — 3008F BODY MASS INDEX DOCD: CPT | Performed by: FAMILY MEDICINE

## 2024-02-08 ENCOUNTER — PATIENT MESSAGE (OUTPATIENT)
Dept: FAMILY MEDICINE CLINIC | Facility: CLINIC | Age: 39
End: 2024-02-08

## 2024-02-08 NOTE — TELEPHONE ENCOUNTER
Message    Triage if needed please   ----- Message -----   From: Marcus Robles   Sent: 2/7/2024  11:41 PM CST   To: [facility] Front Office   Subject: Appointment scheduled from [facility]                Appointment For: Marcus Robles ([id number])   Visit Type: [facility] EXAM ([id number])      2/13/2024   10:00 AM  20 mins.  Irish Rodriguez          [facility] [address]      Patient Comments:   Not getting sleep, Blood pressure seems high while testing at home        RN to pt call to triage.  States he has been getting systolic readings of 135-140 on home automated BP machine x1 month.  States he gets broken sleep most nights, has difficulty getting back to sleep once awake, and is curious if this could be contributing to higher than normal BP readings.  Denies taking any medications for BP currently.  Appt moved from 2/13 to 2/9.  RN instructed pt to bring log of BP readings and his BP machine to his office so the machine can be verified.    Future Appointments   Date Time Provider Department Center   2/9/2024 10:00 AM Irish Rodriguez MD [facility]     Closing encounter.

## 2024-02-09 ENCOUNTER — OFFICE VISIT (OUTPATIENT)
Dept: FAMILY MEDICINE CLINIC | Facility: CLINIC | Age: 39
End: 2024-02-09
Payer: COMMERCIAL

## 2024-02-09 VITALS
RESPIRATION RATE: 18 BRPM | BODY MASS INDEX: 28.88 KG/M2 | DIASTOLIC BLOOD PRESSURE: 90 MMHG | OXYGEN SATURATION: 98 % | HEART RATE: 94 BPM | HEIGHT: 74 IN | SYSTOLIC BLOOD PRESSURE: 160 MMHG | TEMPERATURE: 98 F | WEIGHT: 225 LBS

## 2024-02-09 DIAGNOSIS — R03.0 ELEVATED BLOOD PRESSURE READING IN OFFICE WITHOUT DIAGNOSIS OF HYPERTENSION: Primary | ICD-10-CM

## 2024-02-09 DIAGNOSIS — G47.09 OTHER INSOMNIA: ICD-10-CM

## 2024-02-09 PROCEDURE — 99213 OFFICE O/P EST LOW 20 MIN: CPT | Performed by: FAMILY MEDICINE

## 2024-02-09 PROCEDURE — 3080F DIAST BP >= 90 MM HG: CPT | Performed by: FAMILY MEDICINE

## 2024-02-09 PROCEDURE — 3008F BODY MASS INDEX DOCD: CPT | Performed by: FAMILY MEDICINE

## 2024-02-09 PROCEDURE — 3077F SYST BP >= 140 MM HG: CPT | Performed by: FAMILY MEDICINE

## 2024-02-09 NOTE — PROGRESS NOTES
Marcus Robles is a 38 year old male.  Chief Complaint   Patient presents with    Sleep Problem    Hypertension     HPI:   Marcus Robles is a 38 year old male with no significant past medical history complaining of trouble staying asleep.  Sleeps but geels like his brain has not rested, has been travelling internationally for work frequently and feels that that might have disrupted his sleep routine but recently has not traveled much and still continues to have problems with his sleep.,   Has been snoring occasionally per his wife.  Trouble falling asleep if he wakes up, states it takes him a couple of hours.    BP at home has been high a few readings in the 140's was 134/90 this morning.    Patient states last time after his blood pressure was elevated did monitor his blood pressure at home and it was in the 120s over 80s but then stopped monitoring.  More recently started checking it again and it has been elevated.  Denies any family history of hypertension.  No major stressors.    RITA-7 Scale       Feeling nervous, anxious, or on edge: Not at all  Not being able to stop or control worrying: Not at all  Worrying too much about different things   : Not at all  Trouble relaxing: Not at all  Being so restless that it's hard to sit still: Not at all  Becoming easily annoyed or irritable: Not at all  Feeling afraid as if something awful might happen: Not at all    RITA 7 Total Score: 0            PHQ9 Scale     1. Little interest or pleasure in doing things: Not at all  2. Feeling down, depressed, or hopeless: Not at all  3. Trouble falling or staying asleep, or sleeping too much: Not at all  4. Feeling tired or having little energy: Not at all  5. Poor appetite or overeating: Not at all  6. Feeling bad about yourself - or that you are a failure or have let yourself or your family down: Not at all  7. Trouble concentrating on things, such as reading the newspaper or watching television: Not at all  8. Moving or  speaking so slowly that other people could have noticed. Or the opposite - being so fidgety or restless that you have been moving around a lot more than usual: Not at all  9. Thoughts that you would be better off dead, or of hurting yourself in some way: Not at all  PHQ-9 TOTAL SCORE: 0           ALLERGY:   No Known Allergies    MEDICATIONS:     No current outpatient medications on file.      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   Tobacco Use    Smoking status: Never    Smokeless tobacco: Never   Substance and Sexual Activity    Alcohol use: No    Drug use: No    Sexual activity: Yes     Partners: Female   Other Topics Concern    Caffeine Concern No    Exercise No    Seat Belt No    Special Diet No    Stress Concern No    Weight Concern No   Social History Narrative    Balanced diet,         REVIEW OF SYSTEMS:   GENERAL HEALTH: feels well otherwise  SKIN: denies any unusual skin lesions or rashes  RESPIRATORY: denies shortness of breath with exertion  CARDIOVASCULAR: denies chest pain on exertion  GI: denies abdominal pain and denies heartburn  NEURO: denies headaches    EXAM:   /90   Pulse 94   Temp 97.9 °F (36.6 °C) (Temporal)   Resp 18   Ht 6' 2\" (1.88 m)   Wt 225 lb (102.1 kg)   SpO2 98%   BMI 28.89 kg/m²   GENERAL: well developed, well nourished,in no apparent distress  SKIN: no rashes,no suspicious lesions  HEENT: atraumatic, normocephalic,ears and throat are clear  NECK: supple,no adenopathy,no bruits  LUNGS: clear to auscultation  CARDIO: RRR without murmur  GI: good BS's,no masses, HSM or tenderness  EXTREMITIES: no cyanosis, clubbing or edema    ASSESSMENT AND PLAN:   Marcus was seen  today for sleep problem and hypertension.    Diagnoses and all orders for this visit:    Elevated blood pressure reading in office without diagnosis of hypertension     - encouraged to monitor BP at home     - log and bring to nest visit     - limit salt in diet      - manage stress    Other insomnia    - sleep hygiene discussed    - advised to try otc melatonin to see if it helps.    Return in about 2 weeks (around 2/23/2024) for high blood pressure.    Encounter took 20 min including taking history, performing physical exam, counseling and educating patient, answering patient questions and documenting in EHR.       The 21st Century Cures Act makes medical notes like these available to patients in the interest of transparency. Please be advised this is a medical document. Medical documents are intended to carry relevant information, facts as evident, and the clinical opinion of the practitioner. The medical note is intended as peer to peer communication and may appear blunt or direct. It is written in medical language and may contain abbreviations or verbiage that are unfamiliar.

## 2024-02-09 NOTE — PATIENT INSTRUCTIONS
SLEEP HYGIENE:    Eliminate stimulants like ( Caffeine) after 1 PM, if still with sleeping difficulties, then keep moving back the last caffeine intake  Discontinue daytime naps  Regular exercise improves sleep but do not exercise within 3 hours of your bedtime.  Move dinner to at least 4 hrs before bedtime  No fluids 2 hrs before bedtime.  Keep your bedroom dark and cool  Avois all electronic before bedtime  Go to bed at the same time daily  White nose like a fan or relaxing music will help  Watch You tube Aneesh Barrera sleep hypnosis    With depression and insomnia Bring light therapy helps with seasonal depression, 10 L lux located vertically and placed 1 meter away for 30-90 min after awakening    Please consider CBT.

## 2024-02-27 ENCOUNTER — OFFICE VISIT (OUTPATIENT)
Dept: FAMILY MEDICINE CLINIC | Facility: CLINIC | Age: 39
End: 2024-02-27
Payer: COMMERCIAL

## 2024-02-27 VITALS
BODY MASS INDEX: 28.88 KG/M2 | RESPIRATION RATE: 18 BRPM | OXYGEN SATURATION: 98 % | HEIGHT: 74 IN | TEMPERATURE: 98 F | WEIGHT: 225 LBS | HEART RATE: 97 BPM | SYSTOLIC BLOOD PRESSURE: 146 MMHG | DIASTOLIC BLOOD PRESSURE: 92 MMHG

## 2024-02-27 DIAGNOSIS — Z00.00 LABORATORY EXAMINATION ORDERED AS PART OF A ROUTINE GENERAL MEDICAL EXAMINATION: ICD-10-CM

## 2024-02-27 DIAGNOSIS — I10 ESSENTIAL HYPERTENSION, BENIGN: Primary | ICD-10-CM

## 2024-02-27 DIAGNOSIS — E53.8 B12 DEFICIENCY: ICD-10-CM

## 2024-02-27 DIAGNOSIS — E55.9 VITAMIN D DEFICIENCY: ICD-10-CM

## 2024-02-27 PROCEDURE — 3080F DIAST BP >= 90 MM HG: CPT | Performed by: FAMILY MEDICINE

## 2024-02-27 PROCEDURE — 99214 OFFICE O/P EST MOD 30 MIN: CPT | Performed by: FAMILY MEDICINE

## 2024-02-27 PROCEDURE — 3077F SYST BP >= 140 MM HG: CPT | Performed by: FAMILY MEDICINE

## 2024-02-27 PROCEDURE — 3008F BODY MASS INDEX DOCD: CPT | Performed by: FAMILY MEDICINE

## 2024-02-27 RX ORDER — AMLODIPINE BESYLATE 2.5 MG/1
2.5 TABLET ORAL DAILY
Qty: 45 TABLET | Refills: 0 | Status: SHIPPED | OUTPATIENT
Start: 2024-02-27

## 2024-02-27 NOTE — PROGRESS NOTES
Marcus Robles is a 38 year old male.  Chief Complaint   Patient presents with    Hypertension     HPI:   Marcus Robles is a 38 year old male with no significant past medical history seen for follow-up of elevated blood pressure and insomnia.    Patient states has been monitoring his blood pressure, average has been 130s over .  Patient states does notice when his blood pressure is higher feels like his heart is pumping harder.  No chest pain.  States has been limiting salt, walking daily for exercise.    Intermittent cough since COVID, states feels like has some postnasal drainage with the cough.    Sleep is better.    ALLERGY:   No Known Allergies    MEDICATIONS:     No current outpatient medications on file.      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   Tobacco Use    Smoking status: Never    Smokeless tobacco: Never   Substance and Sexual Activity    Alcohol use: No    Drug use: No    Sexual activity: Yes     Partners: Female   Other Topics Concern    Caffeine Concern No    Exercise No    Seat Belt No    Special Diet No    Stress Concern No    Weight Concern No   Social History Narrative    Balanced diet,         REVIEW OF SYSTEMS:   GENERAL HEALTH: feels well otherwise  SKIN: denies any unusual skin lesions or rashes  RESPIRATORY: denies shortness of breath with exertion  CARDIOVASCULAR: denies chest pain on exertion  GI: denies abdominal pain and denies heartburn  NEURO: denies headaches    EXAM:   /90   Pulse 97   Temp 98 °F (36.7 °C) (Temporal)   Resp 18   Ht 6' 2\" (1.88 m)   Wt 225 lb (102.1 kg)   SpO2 98%   BMI 28.89 kg/m²   GENERAL: well developed,  well nourished,in no apparent distress  SKIN: no rashes,no suspicious lesions  HEENT: atraumatic, normocephalic,ears and throat are clear  NECK: supple,no adenopathy,no bruits  LUNGS: clear to auscultation  CARDIO: RRR without murmur  GI: good BS's,no masses, HSM or tenderness  EXTREMITIES: no cyanosis, clubbing or edema    ASSESSMENT AND PLAN:   Marcus was seen today for hypertension.    Diagnoses and all orders for this visit:    Essential hypertension, benign new onset  -     amLODIPine 2.5 MG Oral Tab; Take 1 tablet (2.5 mg total) by mouth daily.  -     started on low dose medication  -     discussed side effects  -     encouraged to continue to limit salt to less than 1000 mg a day  -     regular exercise    Laboratory examination ordered as part of a routine general medical examination  -     CBC With Differential With Platelet; Future  -     Assay, Thyroid Stim Hormone; Future  -     Lipid Panel; Future  -     Comp Metabolic Panel (14); Future    Vitamin D deficiency  -     Vitamin D [E]; Future    B12 deficiency  -     Vitamin B12 [E]; Future    Return in about 1 month (around 3/27/2024) for HTN f/u.       The 21st Century Cures Act makes medical notes like these available to patients in the interest of transparency. Please be advised this is a medical document. Medical documents are intended to carry relevant information, facts as evident, and the clinical opinion of the practitioner. The medical note is intended as peer to peer communication and may appear blunt or direct. It is written in medical language and may contain abbreviations or verbiage that are unfamiliar.

## 2024-03-26 ENCOUNTER — OFFICE VISIT (OUTPATIENT)
Dept: FAMILY MEDICINE CLINIC | Facility: CLINIC | Age: 39
End: 2024-03-26
Payer: COMMERCIAL

## 2024-03-26 VITALS
WEIGHT: 218 LBS | SYSTOLIC BLOOD PRESSURE: 130 MMHG | BODY MASS INDEX: 27.98 KG/M2 | DIASTOLIC BLOOD PRESSURE: 82 MMHG | HEIGHT: 74 IN | RESPIRATION RATE: 16 BRPM | HEART RATE: 98 BPM | TEMPERATURE: 97 F | OXYGEN SATURATION: 100 %

## 2024-03-26 DIAGNOSIS — I10 ESSENTIAL HYPERTENSION, BENIGN: ICD-10-CM

## 2024-03-26 PROCEDURE — 3075F SYST BP GE 130 - 139MM HG: CPT | Performed by: FAMILY MEDICINE

## 2024-03-26 PROCEDURE — 3008F BODY MASS INDEX DOCD: CPT | Performed by: FAMILY MEDICINE

## 2024-03-26 PROCEDURE — 3079F DIAST BP 80-89 MM HG: CPT | Performed by: FAMILY MEDICINE

## 2024-03-26 PROCEDURE — 99213 OFFICE O/P EST LOW 20 MIN: CPT | Performed by: FAMILY MEDICINE

## 2024-03-26 RX ORDER — AMLODIPINE BESYLATE 2.5 MG/1
2.5 TABLET ORAL DAILY
Qty: 90 TABLET | Refills: 1 | Status: SHIPPED | OUTPATIENT
Start: 2024-03-26

## 2024-03-26 NOTE — PROGRESS NOTES
Marcus Robles is a 38 year old male.  Chief Complaint   Patient presents with    Hypertension     Follow up     HPI:   Marcus Robles is a 38 year old male with history of hypertension seen for follow up and medication refill.  Compliant with medication, tolerating it well without any side effects.  States home BP readings are in the range of 120's/80's.    ALLERGY:   No Known Allergies    MEDICATIONS:     Current Outpatient Medications   Medication Sig Dispense Refill    amLODIPine 2.5 MG Oral Tab Take 1 tablet (2.5 mg total) by mouth daily. 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   Tobacco Use    Smoking status: Never    Smokeless tobacco: Never   Substance and Sexual Activity    Alcohol use: No    Drug use: No    Sexual activity: Yes     Partners: Female   Other Topics Concern    Caffeine Concern No    Exercise No    Seat Belt No    Special Diet No    Stress Concern No    Weight Concern No   Social History Narrative    Balanced diet,         REVIEW OF SYSTEMS:   GENERAL HEALTH: feels well otherwise  SKIN: denies any unusual skin lesions or rashes  RESPIRATORY: denies shortness of breath with exertion  CARDIOVASCULAR: denies chest pain on exertion  GI: denies abdominal pain and denies heartburn  NEURO: denies headaches    EXAM:   /82   Pulse 98   Temp 97 °F (36.1 °C) (Temporal)   Resp 16   Ht 6' 2\" (1.88 m)   Wt 218 lb (98.9 kg)   SpO2 100%   BMI 27.99 kg/m²   GENERAL: well developed, well nourished,in no apparent distress  NECK: supple,no adenopathy,no bruits  LUNGS: clear to auscultation  CARDIO: RRR without murmur  EXTREMITIES: no cyanosis,  clubbing or edema    ASSESSMENT AND PLAN:   Marcus was seen today for hypertension.    Diagnoses and all orders for this visit:    Essential hypertension, benign controlled  -     amLODIPine 2.5 MG Oral Tab; Take 1 tablet (2.5 mg total) by mouth daily.  -     continue the same dose of medication  -     limit salt in diet.    Return in about 6 months (around 9/26/2024) for HTN f/u.       The 21st Century Cures Act makes medical notes like these available to patients in the interest of transparency. Please be advised this is a medical document. Medical documents are intended to carry relevant information, facts as evident, and the clinical opinion of the practitioner. The medical note is intended as peer to peer communication and may appear blunt or direct. It is written in medical language and may contain abbreviations or verbiage that are unfamiliar.

## 2024-06-17 DIAGNOSIS — I10 ESSENTIAL HYPERTENSION, BENIGN: ICD-10-CM

## 2024-06-19 RX ORDER — AMLODIPINE BESYLATE 2.5 MG/1
2.5 TABLET ORAL DAILY
Qty: 90 TABLET | Refills: 1 | OUTPATIENT
Start: 2024-06-19

## 2024-09-27 ENCOUNTER — OFFICE VISIT (OUTPATIENT)
Dept: FAMILY MEDICINE CLINIC | Facility: CLINIC | Age: 39
End: 2024-09-27
Payer: COMMERCIAL

## 2024-09-27 ENCOUNTER — LAB ENCOUNTER (OUTPATIENT)
Dept: LAB | Age: 39
End: 2024-09-27
Attending: FAMILY MEDICINE
Payer: COMMERCIAL

## 2024-09-27 VITALS
DIASTOLIC BLOOD PRESSURE: 78 MMHG | RESPIRATION RATE: 18 BRPM | HEIGHT: 74 IN | OXYGEN SATURATION: 98 % | TEMPERATURE: 98 F | BODY MASS INDEX: 27.98 KG/M2 | SYSTOLIC BLOOD PRESSURE: 130 MMHG | WEIGHT: 218 LBS | HEART RATE: 97 BPM

## 2024-09-27 DIAGNOSIS — E53.8 B12 DEFICIENCY: ICD-10-CM

## 2024-09-27 DIAGNOSIS — Z00.00 ROUTINE GENERAL MEDICAL EXAMINATION AT A HEALTH CARE FACILITY: Primary | ICD-10-CM

## 2024-09-27 DIAGNOSIS — E55.9 VITAMIN D DEFICIENCY: ICD-10-CM

## 2024-09-27 DIAGNOSIS — K21.9 GASTROESOPHAGEAL REFLUX DISEASE, UNSPECIFIED WHETHER ESOPHAGITIS PRESENT: ICD-10-CM

## 2024-09-27 DIAGNOSIS — Z23 NEED FOR VACCINATION: ICD-10-CM

## 2024-09-27 DIAGNOSIS — Z00.00 LABORATORY EXAMINATION ORDERED AS PART OF A ROUTINE GENERAL MEDICAL EXAMINATION: ICD-10-CM

## 2024-09-27 DIAGNOSIS — R14.0 ABDOMINAL BLOATING: ICD-10-CM

## 2024-09-27 DIAGNOSIS — I10 ESSENTIAL HYPERTENSION, BENIGN: ICD-10-CM

## 2024-09-27 LAB
ALBUMIN SERPL-MCNC: 5.1 G/DL (ref 3.2–4.8)
ALBUMIN/GLOB SERPL: 1.5 {RATIO} (ref 1–2)
ALP LIVER SERPL-CCNC: 114 U/L
ALT SERPL-CCNC: 52 U/L
ANION GAP SERPL CALC-SCNC: 4 MMOL/L (ref 0–18)
AST SERPL-CCNC: 39 U/L (ref ?–34)
BASOPHILS # BLD AUTO: 0.04 X10(3) UL (ref 0–0.2)
BASOPHILS NFR BLD AUTO: 0.6 %
BILIRUB SERPL-MCNC: 0.8 MG/DL (ref 0.3–1.2)
BUN BLD-MCNC: 9 MG/DL (ref 9–23)
CALCIUM BLD-MCNC: 10.3 MG/DL (ref 8.7–10.4)
CHLORIDE SERPL-SCNC: 103 MMOL/L (ref 98–112)
CHOLEST SERPL-MCNC: 210 MG/DL (ref ?–200)
CO2 SERPL-SCNC: 28 MMOL/L (ref 21–32)
CREAT BLD-MCNC: 0.97 MG/DL
EGFRCR SERPLBLD CKD-EPI 2021: 101 ML/MIN/1.73M2 (ref 60–?)
EOSINOPHIL # BLD AUTO: 0.04 X10(3) UL (ref 0–0.7)
EOSINOPHIL NFR BLD AUTO: 0.6 %
ERYTHROCYTE [DISTWIDTH] IN BLOOD BY AUTOMATED COUNT: 12.9 %
FASTING PATIENT LIPID ANSWER: YES
FASTING STATUS PATIENT QL REPORTED: YES
GLOBULIN PLAS-MCNC: 3.4 G/DL (ref 2–3.5)
GLUCOSE BLD-MCNC: 111 MG/DL (ref 70–99)
HCT VFR BLD AUTO: 46.2 %
HDLC SERPL-MCNC: 39 MG/DL (ref 40–59)
HGB BLD-MCNC: 15.5 G/DL
IMM GRANULOCYTES # BLD AUTO: 0.02 X10(3) UL (ref 0–1)
IMM GRANULOCYTES NFR BLD: 0.3 %
LDLC SERPL CALC-MCNC: 151 MG/DL (ref ?–100)
LYMPHOCYTES # BLD AUTO: 2.14 X10(3) UL (ref 1–4)
LYMPHOCYTES NFR BLD AUTO: 30.7 %
MCH RBC QN AUTO: 30.3 PG (ref 26–34)
MCHC RBC AUTO-ENTMCNC: 33.5 G/DL (ref 31–37)
MCV RBC AUTO: 90.2 FL
MONOCYTES # BLD AUTO: 0.48 X10(3) UL (ref 0.1–1)
MONOCYTES NFR BLD AUTO: 6.9 %
NEUTROPHILS # BLD AUTO: 4.25 X10 (3) UL (ref 1.5–7.7)
NEUTROPHILS # BLD AUTO: 4.25 X10(3) UL (ref 1.5–7.7)
NEUTROPHILS NFR BLD AUTO: 60.9 %
NONHDLC SERPL-MCNC: 171 MG/DL (ref ?–130)
OSMOLALITY SERPL CALC.SUM OF ELEC: 279 MOSM/KG (ref 275–295)
PLATELET # BLD AUTO: 228 10(3)UL (ref 150–450)
POTASSIUM SERPL-SCNC: 4.5 MMOL/L (ref 3.5–5.1)
PROT SERPL-MCNC: 8.5 G/DL (ref 5.7–8.2)
RBC # BLD AUTO: 5.12 X10(6)UL
SODIUM SERPL-SCNC: 135 MMOL/L (ref 136–145)
TRIGL SERPL-MCNC: 112 MG/DL (ref 30–149)
TSI SER-ACNC: 1.54 MIU/ML (ref 0.55–4.78)
VIT B12 SERPL-MCNC: 315 PG/ML (ref 211–911)
VIT D+METAB SERPL-MCNC: 14.5 NG/ML (ref 30–100)
VLDLC SERPL CALC-MCNC: 21 MG/DL (ref 0–30)
WBC # BLD AUTO: 7 X10(3) UL (ref 4–11)

## 2024-09-27 PROCEDURE — 80050 GENERAL HEALTH PANEL: CPT | Performed by: FAMILY MEDICINE

## 2024-09-27 PROCEDURE — 80061 LIPID PANEL: CPT | Performed by: FAMILY MEDICINE

## 2024-09-27 PROCEDURE — 82607 VITAMIN B-12: CPT | Performed by: FAMILY MEDICINE

## 2024-09-27 PROCEDURE — 82306 VITAMIN D 25 HYDROXY: CPT | Performed by: FAMILY MEDICINE

## 2024-09-27 RX ORDER — AMLODIPINE BESYLATE 2.5 MG/1
2.5 TABLET ORAL DAILY
Qty: 90 TABLET | Refills: 1 | Status: SHIPPED | OUTPATIENT
Start: 2024-09-27

## 2024-09-27 NOTE — PROGRESS NOTES
Family Medicine Progress Note    Marcus Robles is a 39 year old male.  ASSESSMENT AND PLAN:  Marcus was seen today for medication follow-up.    Diagnoses and all orders for this visit:    Routine general medical examination at a health care facility    Essential hypertension, benign controlled  -     amLODIPine 2.5 MG Oral Tab; Take 1 tablet (2.5 mg total) by mouth daily.  -     continue the same dose of medication  -     DASH diet, limit salt to less than 2000 mg a day  -     Goal BP is less than 130/80    Need for vaccination  -     INFLUENZA VACCINE, TRI, PRESERV FREE, 0.5 ML    Abdominal bloating  -     Helicobacter pylori Breath Test, Adult; Future  -     [facility] [employer] US ABDOMEN COMPLETE (CPT=76700); Future  -     [facility] [employer] US ABDOMEN COMPLETE (CPT=76700)    Gastroesophageal reflux disease, unspecified whether esophagitis present  -     Helicobacter pylori Breath Test, Adult; Future  -     [facility] [employer] US ABDOMEN COMPLETE (CPT=76700); Future  -     [facility] [employer] US ABDOMEN COMPLETE (CPT=76700)    Age appropriate anticipatory guidance reviewed  Health Maintenance   Topic Date Due    Annual Physical  06/12/2024    COVID-19 Vaccine (4 - 2023-24 season) 09/01/2024    DTaP,Tdap,and Td Vaccines (3 - Td or Tdap) 10/10/2027    Influenza Vaccine  Completed    Annual Depression Screening  Completed    Pneumococcal Vaccine: Birth to 64yrs  Aged Out         The patient indicates understanding of these issues and agrees to the plan.  Follow-Up: The patient is asked to return in Return in about 6 months (around 3/27/2025) for HTN f/u.  .     Irish Rodriguez MD   09/27/24      HPI:   Marcus Robles is a 39 year old male with history of hypertension seen for his annual physical and medication follow up.    Healthy diet but has not been exercising consistently.    Compliant with taking his blood pressure medication and low salt diet, tolerating medication well without any side effects. Patient  states average reading at home has been 120's/80. Denies, headache dizziness, blurred vision, chest pain, palpitations, ROBERTS or swelling in the legs.    Complaining of abdominal bloating after he eats and occasional sensation of tightness in the chest when he is bloated.     PAST MEDICAL HISTORY:     Past Medical History:    Chest pain on exertion    Esophageal reflux    Essential hypertension    Heartburn    Painful swallowing    Sleep disturbance     PAST SURGICAL HISTORY:   No past surgical history on file.  ALLERGY:   No Known Allergies  MEDICATIONS:     Current Outpatient Medications   Medication Sig Dispense Refill    amLODIPine 2.5 MG Oral Tab Take 1 tablet (2.5 mg total) by mouth daily. 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   HENT:  Negative for congestion, ear pain, hearing loss and sore throat.    Eyes:  Negative for discharge, redness and visual disturbance.   Respiratory:  Negative for cough, chest tightness and shortness of breath.    Cardiovascular:  Negative for chest pain and palpitations.   Gastrointestinal:  Negative for abdominal pain, blood in stool, constipation and  nausea.   Endocrine: Negative for cold intolerance, heat intolerance and polyuria.   Genitourinary:  Negative for difficulty urinating, dysuria, frequency and urgency.   Musculoskeletal:  Negative for arthralgias, gait problem, joint swelling and myalgias.   Skin:  Negative for rash.   Allergic/Immunologic: Negative for food allergies.   Neurological:  Negative for dizziness, weakness, numbness and headaches.   Hematological:  Negative for adenopathy. Does not bruise/bleed easily.   Psychiatric/Behavioral:  Negative for dysphoric mood and sleep disturbance. The patient is not nervous/anxious.         PHYSICAL EXAM:   /78   Pulse 97   Temp 97.9 °F (36.6 °C) (Temporal)   Resp 18   Ht 6' 2\" (1.88 m)   Wt 218 lb (98.9 kg)   SpO2 98%   BMI 27.99 kg/m²     Physical Exam  Constitutional:       General: He is not in acute distress.     Appearance: Normal appearance. He is well-developed and normal weight.   HENT:      Head: Normocephalic and atraumatic.      Right Ear: Tympanic membrane, ear canal and external ear normal.      Left Ear: Tympanic membrane, ear canal and external ear normal.      Nose: Nose normal.      Mouth/Throat:      Mouth: Mucous membranes are moist.      Pharynx: Oropharynx is clear.   Eyes:      Extraocular Movements: Extraocular movements intact.      Conjunctiva/sclera: Conjunctivae normal.      Pupils: Pupils are equal, round, and reactive to light.   Neck:      Thyroid: No thyromegaly.   Cardiovascular:      Rate and Rhythm: Normal rate and regular rhythm.      Heart sounds: Normal heart sounds. No murmur heard.  Pulmonary:      Effort: Pulmonary effort is normal. No respiratory distress.      Breath sounds: Normal breath sounds.   Chest:      Chest wall: No tenderness.   Abdominal:      General: Bowel sounds are normal. There is no distension.      Palpations: Abdomen is soft. There is no hepatomegaly, splenomegaly or mass.      Tenderness: There is no abdominal tenderness.       Hernia: No hernia is present.   Musculoskeletal:         General: Normal range of motion.      Cervical back: Normal range of motion and neck supple.      Right lower leg: No edema.      Left lower leg: No edema.   Lymphadenopathy:      Cervical: No cervical adenopathy.   Skin:     General: Skin is warm.      Findings: No rash.   Neurological:      General: No focal deficit present.      Mental Status: He is alert and oriented to person, place, and time.      Cranial Nerves: No cranial nerve deficit.      Sensory: No sensory deficit.      Motor: No weakness.      Coordination: Coordination normal.      Gait: Gait normal.      Deep Tendon Reflexes: Reflexes are normal and symmetric. Reflexes normal.   Psychiatric:         Mood and Affect: Mood normal.         Behavior: Behavior normal.         Thought Content: Thought content normal.         Judgment: Judgment normal.           The 21st Century Cures Act makes medical notes like these available to patients in the interest of transparency. Please be advised this is a medical document. Medical documents are intended to carry relevant information, facts as evident, and the clinical opinion of the practitioner. The medical note is intended as peer to peer communication and may appear blunt or direct. It is written in medical language and may contain abbreviations or verbiage that are unfamiliar.     Irish Rodriguez MD  Family medicine

## 2024-10-01 LAB
EST. AVERAGE GLUCOSE BLD GHB EST-MCNC: 140 MG/DL (ref 68–126)
HBA1C MFR BLD: 6.5 % (ref ?–5.7)

## 2024-10-22 ENCOUNTER — LAB ENCOUNTER (OUTPATIENT)
Dept: LAB | Age: 39
End: 2024-10-22
Attending: FAMILY MEDICINE
Payer: COMMERCIAL

## 2024-10-22 ENCOUNTER — OFFICE VISIT (OUTPATIENT)
Dept: FAMILY MEDICINE CLINIC | Facility: CLINIC | Age: 39
End: 2024-10-22
Payer: COMMERCIAL

## 2024-10-22 VITALS
HEIGHT: 74 IN | SYSTOLIC BLOOD PRESSURE: 118 MMHG | DIASTOLIC BLOOD PRESSURE: 82 MMHG | BODY MASS INDEX: 28.11 KG/M2 | WEIGHT: 219 LBS | RESPIRATION RATE: 18 BRPM | HEART RATE: 98 BPM | TEMPERATURE: 98 F | OXYGEN SATURATION: 98 %

## 2024-10-22 DIAGNOSIS — R73.03 PREDIABETES: Primary | ICD-10-CM

## 2024-10-22 DIAGNOSIS — R74.01 ELEVATED TRANSAMINASE LEVEL: ICD-10-CM

## 2024-10-22 DIAGNOSIS — K21.9 GASTROESOPHAGEAL REFLUX DISEASE, UNSPECIFIED WHETHER ESOPHAGITIS PRESENT: ICD-10-CM

## 2024-10-22 DIAGNOSIS — E78.00 PURE HYPERCHOLESTEROLEMIA: ICD-10-CM

## 2024-10-22 DIAGNOSIS — R14.0 ABDOMINAL BLOATING: ICD-10-CM

## 2024-10-22 DIAGNOSIS — E55.9 VITAMIN D DEFICIENCY: ICD-10-CM

## 2024-10-22 PROCEDURE — 83013 H PYLORI (C-13) BREATH: CPT | Performed by: FAMILY MEDICINE

## 2024-10-22 PROCEDURE — 3008F BODY MASS INDEX DOCD: CPT | Performed by: FAMILY MEDICINE

## 2024-10-22 PROCEDURE — 3079F DIAST BP 80-89 MM HG: CPT | Performed by: FAMILY MEDICINE

## 2024-10-22 PROCEDURE — 3074F SYST BP LT 130 MM HG: CPT | Performed by: FAMILY MEDICINE

## 2024-10-22 PROCEDURE — 99213 OFFICE O/P EST LOW 20 MIN: CPT | Performed by: FAMILY MEDICINE

## 2024-10-22 NOTE — PROGRESS NOTES
Family Medicine Progress Note  ASSESSMENT AND PLAN:  Marcus Robles is a 39 year old male who is here for:     Marcus was seen today for lab results.    Diagnoses and all orders for this visit:    Prediabetes       - encouraged to limit refined sugars and carbohydrates in diet.       - HgbA1c in the diabetic range       - recheck labs in 3 months       - discussed referral to dietician.       -  patient would like to try life style modification before considering medication         Pure hypercholesterolemia       - advised to limit saturated fats and cholesterol in diet       -  encouraged regular exercise.    Elevated transaminase level       - advised if repeat labs show no improvement should get US of abdomen.    Vitamin D deficiency       - advised to take as prescribed.       The patient indicates understanding of these issues and agrees to the plan.  Follow-Up: The patient is asked to  Return in about 3 months (around 1/22/2025) for Diabetes f/u, hyperlipidemia.    .     Irish Rodriguez MD   10/22/24      CC: Lab Results    HPI:   Marcus Robles is a 39 year old male seen for follow up and to discuss his lab results..    States has changed his diet and is planning on exercising more, would like to discuss dietary changes.    ALLERGY:   Allergies[1]    MEDICATIONS:     Current Outpatient Medications   Medication Sig Dispense Refill    ergocalciferol 1.25 MG (54893 UT) Oral Cap Take 1 capsule (50,000 Units total) by mouth once a week. 12 capsule 0    amLODIPine 2.5 MG Oral Tab Take 1 tablet (2.5 mg total) by mouth daily. 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  Pertinent positives and negatives noted in the the HPI.      EXAM:   /82   Pulse 98   Temp 98 °F (36.7 °C) (Temporal)   Resp 18   Ht 6' 2\" (1.88 m)   Wt 219 lb (99.3 kg)   SpO2 98%   BMI 28.12 kg/m²   GENERAL: well developed, well nourished,in no apparent distress  NECK: supple,  LUNGS: clear to auscultation  CARDIO: RRR without murmur    Component      Latest Ref Rng 9/27/2024   WBC      4.0 - 11.0 x10(3) uL 7.0    RBC      4.30 - 5.70 x10(6)uL 5.12    Hemoglobin      13.0 - 17.5 g/dL 15.5    Hematocrit      39.0 - 53.0 % 46.2    Platelet Count      150.0 - 450.0 10(3)uL 228.0    MCV      80.0 - 100.0 fL 90.2    MCH      26.0 - 34.0 pg 30.3    MCHC      31.0 - 37.0 g/dL 33.5    RDW      % 12.9    Prelim Neutrophil Abs      1.50 - 7.70 x10 (3) uL 4.25    Neutrophils Absolute      1.50 - 7.70 x10(3) uL 4.25    Lymphocytes Absolute      1.00 - 4.00 x10(3) uL 2.14    Monocytes Absolute      0.10 - 1.00 x10(3) uL 0.48    Eosinophils Absolute      0.00 - 0.70 x10(3) uL 0.04    Basophils Absolute      0.00 - 0.20 x10(3) uL 0.04    Immature Granulocyte Absolute      0.00 - 1.00 x10(3) uL 0.02    Neutrophils %      % 60.9    Lymphocytes %      % 30.7    Monocytes %      % 6.9    Eosinophils %      % 0.6    Basophils %      % 0.6    Immature Granulocyte %      % 0.3    Glucose      70 - 99 mg/dL 111 (H)    Sodium      136 - 145 mmol/L 135 (L)    Potassium      3.5 - 5.1 mmol/L 4.5    Chloride      98 - 112 mmol/L 103    Carbon Dioxide, Total      21.0 -  32.0 mmol/L 28.0    ANION GAP      0 - 18 mmol/L 4    BUN      9 - 23 mg/dL 9    CREATININE      0.70 - 1.30 mg/dL 0.97    CALCIUM      8.7 - 10.4 mg/dL 10.3    CALCULATED OSMOLALITY      275 - 295 mOsm/kg 279    EGFR      >=60 mL/min/1.73m2 101    AST (SGOT)      <34 U/L 39 (H)    ALT (SGPT)      10 - 49 U/L 52 (H)    ALKALINE PHOSPHATASE      45 - 117 U/L 114    Total Bilirubin      0.3 - 1.2 mg/dL 0.8    PROTEIN, TOTAL      5.7 - 8.2 g/dL 8.5 (H)    Albumin      3.2 - 4.8 g/dL 5.1 (H)    Globulin      2.0 - 3.5 g/dL 3.4    A/G Ratio      1.0 - 2.0  1.5    Patient Fasting for CMP? Yes    Cholesterol, Total      <200 mg/dL 210 (H)    HDL Cholesterol      40 - 59 mg/dL 39 (L)    Triglycerides      30 - 149 mg/dL 112    LDL Cholesterol Calc      <100 mg/dL 151 (H)    VLDL      0 - 30 mg/dL 21    NON-HDL CHOLESTEROL      <130 mg/dL 171 (H)    Patient Fasting for Lipid? Yes    HEMOGLOBIN A1c      <5.7 % 6.5 (H)    ESTIMATED AVERAGE GLUCOSE      68 - 126 mg/dL 140 (H)    TSH      0.550 - 4.780 mIU/mL 1.538    Vitamin B12      211 - 911 pg/mL 315    VITAMIN D, 25-OH, TOTAL      30.0 - 100.0 ng/mL 14.5 (L)         NOTE TO PATIENT: The 21st Century Cures Act makes clinical notes like these available to patients in the interest of transparency. Clinical notes are medical documents used by physicians and care providers to communicate with each other. These documents include medical language and terminology, abbreviations, and treatment information that may sound technical and at times possibly unfamiliar. In addition, at times, the verbiage may appear blunt or direct. These documents are one tool providers use to communicate relevant information and clinical opinions of the care providers in a way that allows common understanding of the clinical context.      Irish Rodriguez MD    10/22/24 1:32 PM           [1] No Known Allergies

## 2024-10-24 LAB — H PYLORI BREATH TEST: NEGATIVE

## 2025-01-09 DIAGNOSIS — E55.9 VITAMIN D DEFICIENCY: ICD-10-CM

## 2025-01-14 RX ORDER — ERGOCALCIFEROL 1.25 MG/1
50000 CAPSULE, LIQUID FILLED ORAL WEEKLY
Qty: 12 CAPSULE | Refills: 0 | OUTPATIENT
Start: 2025-01-14

## 2025-01-14 NOTE — TELEPHONE ENCOUNTER
You have vitamin D deficiency, prescription sent to pharmacy, please take once a week for 3 months and then continue over the counter vitamin D 2000IU daily.  Total cholesterol and LDL are elevated with low HDL, please limit saturated fats and cholesterol in diet and exercise regularly.  Fastign sugar is elevated, I have added HgbA1c to your existing lab specimen, limit refined sugars and carbs in diet.  Liver enzymes are elevated, I would like you to check US of liver, placed an order at your appointment please get it done.  Your vitamin B12 is slightly low, take over the counter vitamin b!2 1000mcg daily.  Recheck labs in 6 months.     [place],     Your HgbA1c is in the diabetic range, please follow up with me to discuss your labs.   Written by Irish Rodriguez MD on 10/1/2024 12:33 PM CDT

## 2025-02-26 ENCOUNTER — LAB ENCOUNTER (OUTPATIENT)
Dept: LAB | Age: 40
End: 2025-02-26
Attending: FAMILY MEDICINE
Payer: COMMERCIAL

## 2025-02-26 DIAGNOSIS — R73.01 IMPAIRED FASTING BLOOD SUGAR: ICD-10-CM

## 2025-02-26 DIAGNOSIS — E78.00 PURE HYPERCHOLESTEROLEMIA: ICD-10-CM

## 2025-02-26 LAB
ALBUMIN SERPL-MCNC: 5 G/DL (ref 3.2–4.8)
ALBUMIN/GLOB SERPL: 1.7 {RATIO} (ref 1–2)
ALP LIVER SERPL-CCNC: 107 U/L
ALT SERPL-CCNC: 36 U/L
ANION GAP SERPL CALC-SCNC: 5 MMOL/L (ref 0–18)
AST SERPL-CCNC: 28 U/L (ref ?–34)
BILIRUB SERPL-MCNC: 0.7 MG/DL (ref 0.3–1.2)
BUN BLD-MCNC: 12 MG/DL (ref 9–23)
CALCIUM BLD-MCNC: 10 MG/DL (ref 8.7–10.6)
CHLORIDE SERPL-SCNC: 102 MMOL/L (ref 98–112)
CHOLEST SERPL-MCNC: 216 MG/DL (ref ?–200)
CO2 SERPL-SCNC: 30 MMOL/L (ref 21–32)
CREAT BLD-MCNC: 1.04 MG/DL
EGFRCR SERPLBLD CKD-EPI 2021: 93 ML/MIN/1.73M2 (ref 60–?)
EST. AVERAGE GLUCOSE BLD GHB EST-MCNC: 137 MG/DL (ref 68–126)
FASTING PATIENT LIPID ANSWER: YES
FASTING STATUS PATIENT QL REPORTED: YES
GLOBULIN PLAS-MCNC: 2.9 G/DL (ref 2–3.5)
GLUCOSE BLD-MCNC: 115 MG/DL (ref 70–99)
HBA1C MFR BLD: 6.4 % (ref ?–5.7)
HDLC SERPL-MCNC: 39 MG/DL (ref 40–59)
LDLC SERPL CALC-MCNC: 157 MG/DL (ref ?–100)
NONHDLC SERPL-MCNC: 177 MG/DL (ref ?–130)
OSMOLALITY SERPL CALC.SUM OF ELEC: 285 MOSM/KG (ref 275–295)
POTASSIUM SERPL-SCNC: 4.1 MMOL/L (ref 3.5–5.1)
PROT SERPL-MCNC: 7.9 G/DL (ref 5.7–8.2)
SODIUM SERPL-SCNC: 137 MMOL/L (ref 136–145)
TRIGL SERPL-MCNC: 111 MG/DL (ref 30–149)
VLDLC SERPL CALC-MCNC: 21 MG/DL (ref 0–30)

## 2025-02-26 PROCEDURE — 80053 COMPREHEN METABOLIC PANEL: CPT | Performed by: FAMILY MEDICINE

## 2025-02-26 PROCEDURE — 83036 HEMOGLOBIN GLYCOSYLATED A1C: CPT | Performed by: FAMILY MEDICINE

## 2025-02-26 PROCEDURE — 80061 LIPID PANEL: CPT | Performed by: FAMILY MEDICINE

## 2025-02-28 ENCOUNTER — OFFICE VISIT (OUTPATIENT)
Dept: FAMILY MEDICINE CLINIC | Facility: CLINIC | Age: 40
End: 2025-02-28
Payer: COMMERCIAL

## 2025-02-28 VITALS
RESPIRATION RATE: 18 BRPM | WEIGHT: 217 LBS | SYSTOLIC BLOOD PRESSURE: 130 MMHG | OXYGEN SATURATION: 98 % | BODY MASS INDEX: 27.85 KG/M2 | DIASTOLIC BLOOD PRESSURE: 84 MMHG | TEMPERATURE: 98 F | HEART RATE: 84 BPM | HEIGHT: 74 IN

## 2025-02-28 DIAGNOSIS — Z00.00 LABORATORY EXAMINATION ORDERED AS PART OF A ROUTINE GENERAL MEDICAL EXAMINATION: ICD-10-CM

## 2025-02-28 DIAGNOSIS — R73.03 PREDIABETES: ICD-10-CM

## 2025-02-28 DIAGNOSIS — E78.00 PURE HYPERCHOLESTEROLEMIA: ICD-10-CM

## 2025-02-28 DIAGNOSIS — E53.8 B12 DEFICIENCY: ICD-10-CM

## 2025-02-28 DIAGNOSIS — I10 ESSENTIAL HYPERTENSION, BENIGN: Primary | ICD-10-CM

## 2025-02-28 DIAGNOSIS — E55.9 VITAMIN D DEFICIENCY: ICD-10-CM

## 2025-02-28 PROCEDURE — 3079F DIAST BP 80-89 MM HG: CPT | Performed by: FAMILY MEDICINE

## 2025-02-28 PROCEDURE — 99214 OFFICE O/P EST MOD 30 MIN: CPT | Performed by: FAMILY MEDICINE

## 2025-02-28 PROCEDURE — 3008F BODY MASS INDEX DOCD: CPT | Performed by: FAMILY MEDICINE

## 2025-02-28 PROCEDURE — 3075F SYST BP GE 130 - 139MM HG: CPT | Performed by: FAMILY MEDICINE

## 2025-02-28 RX ORDER — AMLODIPINE BESYLATE 2.5 MG/1
2.5 TABLET ORAL DAILY
Qty: 90 TABLET | Refills: 1 | Status: SHIPPED | OUTPATIENT
Start: 2025-02-28

## 2025-02-28 NOTE — PROGRESS NOTES
Family Medicine Progress Note    Marcus Robles is a 39 year old male.  ASSESSMENT AND PLAN:  Marcus was seen today for hypertension and other.    Diagnoses and all orders for this visit:    Essential hypertension, benign controlled  Currently taking amlodipine for hypertension. Emphasized the importance of consistent physical activity to improve cardiovascular health and manage blood pressure.   -     amLODIPine 2.5 MG Oral Tab; Take 1 tablet (2.5 mg total) by mouth daily.  -  continue the same dose of medication  -  DASH diet, limit salt to less than 2000 mg  -  Goal BP less than 130/80    Pure hypercholesterolemia uncontrolled       - encouraged to limit saturated fats and cholesterol in diet    Prediabetes  -     Hemoglobin A1C [E]; Future  -     encouraged to limit refined sugars and carbs in diet.    Laboratory examination ordered as part of a routine general medical examination  -     CBC With Differential With Platelet; Future  -     Assay, Thyroid Stim Hormone; Future  -     Lipid Panel; Future  -     Comp Metabolic Panel (14); Future    Vitamin D deficiency  -     Vitamin D; Future    B12 deficiency  -     Vitamin B12 [E]; Future      The patient indicates understanding of these issues and agrees to the plan.  Follow-Up: The patient is asked to return in Return in about 6 months (around 8/28/2025) for annual, HTN f/u, hyperlipidemia.  .       Irish Rodriguez MD        HPI:     History of Present Illness  Marcus Robles is a 39 year old male with hypertension and diabetes presents for a follow-up.    Has been compliant with his blood pressure medication and low salt diet. Tolerating medication well without any side effects.  Denies headache, dizziness, has some vision changes and feels it is very forcing in the morning, denies chest pain, palpitations, ROBERTS or swelling in his legs.    He faces challenges in maintaining physical activity, has changed his diet,  He has  reduced his intake of sweets, completely avoiding ice cream, but still consumes some sweet occasionally. He includes vegetables in his diet but acknowledges a lack of physical activity, planning to increase it in the summer.      ALLERGY:     Allergies as of 02/28/2025    (No Known Allergies)     MEDICATIONS:     Current Outpatient Medications   Medication Sig Dispense Refill    ergocalciferol 1.25 MG (82200 UT) Oral Cap Take 1 capsule (50,000 Units total) by mouth once a week. 12 capsule 0    amLODIPine 2.5 MG Oral Tab Take 1 tablet (2.5 mg total) by mouth daily. 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   Tobacco Use    Smoking status: Never    Smokeless tobacco: Never   Vaping Use    Vaping status: Never Used   Substance and Sexual Activity    Alcohol use: No    Drug use: No    Sexual activity: Yes     Partners: Female   Other Topics Concern    Caffeine Concern No    Exercise No    Seat Belt No    Special Diet No    Stress Concern No    Weight Concern No   Social History Narrative    Balanced diet,      Social Drivers of Health     Food Insecurity: No Food Insecurity (2/28/2025)    NCSS - Food Insecurity     Worried About Running Out of Food in the Last Year: No     Ran Out of Food in the Last Year: No   Transportation Needs: No Transportation Needs (2/28/2025)    NCSS - Transportation     Lack of Transportation: No   Housing Stability: Not At Risk (2/28/2025)    NCSS - Housing/Utilities     Has Housing: Yes     Worried About Losing Housing: No     Unable to Get Utilities: No     REVIEW OF SYSTEMS:   Review of Systems   Constitutional:  Negative for activity change and fatigue.   Eyes:  Negative for visual disturbance.    Respiratory:  Negative for apnea and shortness of breath.    Cardiovascular:  Negative for chest pain, palpitations and leg swelling.   Gastrointestinal:  Negative for abdominal pain.   Skin:  Negative for pallor.   Neurological:  Negative for dizziness and headaches.        PHYSICAL EXAM:   /84   Pulse 84   Temp 97.9 °F (36.6 °C) (Temporal)   Resp 18   Ht 6' 2\" (1.88 m)   Wt 217 lb (98.4 kg)   SpO2 98%   BMI 27.86 kg/m²     Physical Exam  Constitutional:       Appearance: Normal appearance. He is well-developed.   Neck:      Vascular: No carotid bruit or JVD.   Cardiovascular:      Rate and Rhythm: Normal rate and regular rhythm.      Pulses: Normal pulses.      Heart sounds: Normal heart sounds. No murmur heard.  Pulmonary:      Effort: Pulmonary effort is normal.      Breath sounds: Normal breath sounds.   Musculoskeletal:      Cervical back: Neck supple.      Right lower leg: No edema.      Left lower leg: No edema.   Neurological:      Mental Status: He is alert.        LABS AND IMAGING:     Component      Latest Ref Rn 2/26/2025   Glucose      70 - 99 mg/dL 115 (H)    Sodium      136 - 145 mmol/L 137    Potassium      3.5 - 5.1 mmol/L 4.1    Chloride      98 - 112 mmol/L 102    Carbon Dioxide, Total      21.0 - 32.0 mmol/L 30.0    ANION GAP      0 - 18 mmol/L 5    BUN      9 - 23 mg/dL 12    CREATININE      0.70 - 1.30 mg/dL 1.04    CALCIUM      8.7 - 10.6 mg/dL 10.0    CALCULATED OSMOLALITY      275 - 295 mOsm/kg 285    EGFR      >=60 mL/min/1.73m2 93    AST (SGOT)      <34 U/L 28    ALT (SGPT)      10 - 49 U/L 36    ALKALINE PHOSPHATASE      45 - 117 U/L 107    Total Bilirubin      0.3 - 1.2 mg/dL 0.7    PROTEIN, TOTAL      5.7 - 8.2 g/dL 7.9    Albumin      3.2 - 4.8 g/dL 5.0 (H)    Globulin      2.0 - 3.5 g/dL 2.9    A/G Ratio      1.0 - 2.0  1.7    Patient Fasting for CMP? Yes    Cholesterol, Total      <200 mg/dL 216 (H)    HDL Cholesterol      40 - 59 mg/dL 39 (L)    Triglycerides       30 - 149 mg/dL 111    LDL Cholesterol Calc      <100 mg/dL 157 (H)    VLDL      0 - 30 mg/dL 21    NON-HDL CHOLESTEROL      <130 mg/dL 177 (H)    Patient Fasting for Lipid? Yes    HEMOGLOBIN A1c      <5.7 % 6.4 (H)    ESTIMATED AVERAGE GLUCOSE      68 - 126 mg/dL 137 (H)       Legend:  (H) High  (L) Low       The 21st Century Cures Act makes medical notes like these available to patients in the interest of transparency. Please be advised this is a medical document. Medical documents are intended to carry relevant information, facts as evident, and the clinical opinion of the practitioner. The medical note is intended as peer to peer communication and may appear blunt or direct. It is written in medical language and may contain abbreviations or verbiage that are unfamiliar.     Irish Rodriguez MD

## (undated) NOTE — LETTER
02/05/18        Marcus Robles  [address] [facility] [place] [address]      Dear [facility] records indicate that you have outstanding lab work and or testing that was ordered for you and has not yet been completed:          Vitamin D, 25-Hydroxy [E]